# Patient Record
Sex: MALE | Race: WHITE | Employment: OTHER | URBAN - NONMETROPOLITAN AREA
[De-identification: names, ages, dates, MRNs, and addresses within clinical notes are randomized per-mention and may not be internally consistent; named-entity substitution may affect disease eponyms.]

---

## 2020-11-14 ENCOUNTER — HOSPITAL ENCOUNTER (EMERGENCY)
Facility: OTHER | Age: 55
Discharge: HOME | End: 2020-11-14
Attending: PHYSICIAN ASSISTANT | Admitting: PHYSICIAN ASSISTANT
Payer: COMMERCIAL

## 2020-11-14 ENCOUNTER — APPOINTMENT (OUTPATIENT)
Dept: GENERAL RADIOLOGY | Facility: OTHER | Age: 55
End: 2020-11-14
Attending: PHYSICIAN ASSISTANT
Payer: COMMERCIAL

## 2020-11-14 VITALS
HEART RATE: 68 BPM | OXYGEN SATURATION: 96 % | WEIGHT: 300 LBS | HEIGHT: 76 IN | BODY MASS INDEX: 36.53 KG/M2 | DIASTOLIC BLOOD PRESSURE: 110 MMHG | SYSTOLIC BLOOD PRESSURE: 186 MMHG | TEMPERATURE: 100 F | RESPIRATION RATE: 20 BRPM

## 2020-11-14 DIAGNOSIS — S51.812A FOREARM LACERATION, LEFT, INITIAL ENCOUNTER: ICD-10-CM
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
## 2020-11-14 PROCEDURE — 99283 EMERGENCY DEPT VISIT LOW MDM: CPT | Mod: 25 | Performed by: PHYSICIAN ASSISTANT

## 2020-11-14 PROCEDURE — 73090 X-RAY EXAM OF FOREARM: CPT | Mod: LT

## 2020-11-14 PROCEDURE — 12001 RPR S/N/AX/GEN/TRNK 2.5CM/<: CPT | Performed by: PHYSICIAN ASSISTANT

## 2020-11-14 PROCEDURE — 99282 EMERGENCY DEPT VISIT SF MDM: CPT | Mod: 25 | Performed by: PHYSICIAN ASSISTANT

## 2020-11-14 RX ORDER — LIDOCAINE HYDROCHLORIDE AND EPINEPHRINE 10; 10 MG/ML; UG/ML
5 INJECTION, SOLUTION INFILTRATION; PERINEURAL ONCE
Status: DISCONTINUED | OUTPATIENT
Start: 2020-11-14 | End: 2020-11-14 | Stop reason: HOSPADM

## 2020-11-14 ASSESSMENT — ENCOUNTER SYMPTOMS
ABDOMINAL PAIN: 0
FEVER: 0
CHILLS: 0
BACK PAIN: 0
CONFUSION: 0
SHORTNESS OF BREATH: 0
BRUISES/BLEEDS EASILY: 0
CHEST TIGHTNESS: 0
ADENOPATHY: 0
HEMATURIA: 0
WOUND: 1

## 2020-11-14 ASSESSMENT — MIFFLIN-ST. JEOR: SCORE: 2293.32

## 2020-11-14 NOTE — ED PROVIDER NOTES
"  History     Chief Complaint   Patient presents with     Puncture Wound     Laceration     HPI  Jerry Clement is a 55 year old male who presents to the ED for evaluation of a fall/laceration. He states he was on scaffolding that collapsed and he \"rode it down\" landing on left knee, left forearm and left shoulder.  He has good ROM in knee and shoulder, however sore.  His forearm has a laceration where he states something (he's thinking OSB board) punctured his arm.  He rates his pain 6/10.    Allergies:  No Known Allergies    Problem List:    There are no active problems to display for this patient.       Past Medical History:    History reviewed. No pertinent past medical history.    Past Surgical History:    History reviewed. No pertinent surgical history.    Family History:    History reviewed. No pertinent family history.    Social History:  Marital Status:   [2]  Social History     Tobacco Use     Smoking status: Never Smoker     Smokeless tobacco: Never Used   Substance Use Topics     Alcohol use: Yes     Alcohol/week: 4.0 standard drinks     Types: 4 Cans of beer per week     Drug use: Never        Medications:    No current outpatient medications on file.        Review of Systems   Constitutional: Negative for chills and fever.   HENT: Negative for congestion.    Eyes: Negative for visual disturbance.   Respiratory: Negative for chest tightness and shortness of breath.    Cardiovascular: Negative for chest pain.   Gastrointestinal: Negative for abdominal pain.   Genitourinary: Negative for hematuria.   Musculoskeletal: Negative for back pain.        Left forearm swelling, left shoulder pain   Skin: Positive for wound. Negative for rash.   Neurological: Negative for syncope.   Hematological: Negative for adenopathy. Does not bruise/bleed easily.   Psychiatric/Behavioral: Negative for confusion.       Physical Exam   BP: (!) 186/110  Pulse: 68  Temp: 100  F (37.8  C)  Resp: 20  Height: 192.4 cm (6' " "3.75\")  Weight: 136.1 kg (300 lb)  SpO2: 96 %      Physical Exam  Constitutional:       General: He is not in acute distress.     Appearance: He is well-developed. He is not diaphoretic.   HENT:      Head: Normocephalic and atraumatic.   Eyes:      General: No scleral icterus.     Conjunctiva/sclera: Conjunctivae normal.   Neck:      Musculoskeletal: Neck supple.   Cardiovascular:      Rate and Rhythm: Normal rate and regular rhythm.   Pulmonary:      Effort: Pulmonary effort is normal.      Breath sounds: Normal breath sounds.   Abdominal:      Palpations: Abdomen is soft.      Tenderness: There is no abdominal tenderness.   Musculoskeletal:         General: No deformity.      Comments: General TTP of left shoulder. Left forearm swelling, TTP. Approximate 2cm laceration to left anterior forearm, very minimal bleeding.   Lymphadenopathy:      Cervical: No cervical adenopathy.   Skin:     General: Skin is warm and dry.      Findings: No rash.   Neurological:      Mental Status: He is alert and oriented to person, place, and time. Mental status is at baseline.   Psychiatric:         Mood and Affect: Mood normal.         Behavior: Behavior normal.         Thought Content: Thought content normal.         Judgment: Judgment normal.         ED Course        [facility]    -Laceration Repair    Date/Time: 11/14/2020 9:29 PM  Performed by: Maurisio Giles PA  Authorized by: Maurisio Giles PA       ANESTHESIA (see MAR for exact dosages):     Anesthesia method:  Local infiltration    Local anesthetic:  Lidocaine 1% WITH epi  LACERATION DETAILS     Location:  Shoulder/arm    Shoulder/arm location:  L lower arm    Length (cm):  2    REPAIR TYPE:     Repair type:  Simple      EXPLORATION:     Hemostasis achieved with:  Epinephrine    Wound exploration: wound explored through full range of motion and entire depth of wound probed and visualized      Contaminated: no      TREATMENT:     Area cleansed " with:  Saline    Amount of cleaning:  Extensive    Irrigation solution:  Sterile saline    Irrigation method:  Pressure wash    Visualized foreign bodies/material removed: no      SKIN REPAIR     Repair method:  Sutures    Suture size:  4-0    Suture material:  Nylon    Suture technique:  Simple interrupted    Number of sutures:  4    APPROXIMATION     Approximation:  Close    POST-PROCEDURE DETAILS     Dressing:  Non-adherent dressing      PROCEDURE   Patient Tolerance:  Patient tolerated the procedure well with no immediate complications                     Critical Care time:  none               Results for orders placed or performed during the hospital encounter of 11/14/20 (from the past 24 hour(s))   XR Forearm Left 2 Views    Narrative    XR FOREARM LT 2 VW    HISTORY: 55 years Male fall off scaffolding, puncture wound and  swelling to left forearm, FX? FB?    COMPARISON: None    TECHNIQUE: 2 views left forearm    FINDINGS: There is no evidence of fracture or dislocation. Joint  spaces are congruent.    There is soft tissue edema of the forearm. No radiopaque foreign  bodies are seen.      Impression    IMPRESSION: Soft tissue edema. No evidence of fracture. No evidence of  radiopaque foreign body.    TAMARA CASTRO MD       Medications - No data to display    Assessments & Plan (with Medical Decision Making)   Pt nontoxic in NAD. Heart, lung, bowel sounds normal, abd soft, nontender to palpation, nondistended. VSS, afebrile.     He has no neck, back, head pain. He does have some general left shoulder pain but with full ROM. Good distal CMS. He has an approximate 2 cm laceration on the anterior portion of his left forearm, no bleeding.  Again, good distal CMS.    X-rays of left forearm showed no acute findings.  I do offer him imaging of the left shoulder but he declines at this time.    Wound was repaired, see procedure note above.  He denies a tetanus shot and says he does not believe in  vaccines.    Patient will continue conservative treatment at home or return ED if there are worsening or concerning symptoms.  He understands and agrees with plan the patient is discharged.    Maurisio Giles PA-C    I have reviewed the nursing notes.    I have reviewed the findings, diagnosis, plan and need for follow up with the patient.       There are no discharge medications for this patient.      Final diagnoses:   Fall   Forearm laceration, left, initial encounter       11/14/2020   [facility]     Maurisio Giles PA  11/14/20 0090

## 2020-11-14 NOTE — ED AVS SNAPSHOT
[facility]  [address]  [place] [address]  Phone: [phone]  Fax: [phone]                                    Jerry Clement   MRN: [id number]    Department: [facility]   Date of Visit: 11/14/2020           After Visit Summary Signature Page    I have received my discharge instructions, and my questions have been answered. I have discussed any challenges I see with this plan with the nurse or doctor.    ..........................................................................................................................................  Patient/Patient Representative Signature      ..........................................................................................................................................  Patient Representative Print Name and Relationship to Patient    ..................................................               ................................................  Date                                   Time    ..........................................................................................................................................  Reviewed by Signature/Title    ...................................................              ..............................................  Date                                               Time          22EPIC Rev 08/18

## 2020-11-14 NOTE — ED TRIAGE NOTES
"Patient presents to ER stating he was on scaffolding that collapsed and he \"rode it down\" landing on left knee, left forearm and left shoulder.  He has good ROM in knee and shoulder, however sore.  His forearm has a laceration where he states something (he's thinking OSB board) punctured his arm.  He rates his pain 6/10, good CWMS.   "

## 2020-11-15 NOTE — DISCHARGE INSTRUCTIONS
Get plenty of fluids and rest.  You can take Tylenol to help with discomfort.  Keep your wound covered the rest of today.  Tomorrow you can remove the bandage, clean twice per day gently with soap and water, reapply bandage as needed.  Sutures should be removed approximately 10 to 14 days.  Be aware for signs of infection such as increased redness, purulent drainage, fevers of these occur seek medical attention.  Follow-up with PCP as needed.

## 2025-01-25 ENCOUNTER — OFFICE VISIT (OUTPATIENT)
Dept: FAMILY MEDICINE | Facility: OTHER | Age: 60
End: 2025-01-25
Attending: NURSE PRACTITIONER

## 2025-01-25 ENCOUNTER — HOSPITAL ENCOUNTER (OUTPATIENT)
Dept: GENERAL RADIOLOGY | Facility: OTHER | Age: 60
Discharge: HOME | End: 2025-01-25
Attending: NURSE PRACTITIONER

## 2025-01-25 VITALS
HEIGHT: 75 IN | SYSTOLIC BLOOD PRESSURE: 130 MMHG | TEMPERATURE: 98.4 F | WEIGHT: 270 LBS | DIASTOLIC BLOOD PRESSURE: 90 MMHG | OXYGEN SATURATION: 94 % | RESPIRATION RATE: 20 BRPM | BODY MASS INDEX: 33.57 KG/M2 | HEART RATE: 68 BPM

## 2025-01-25 DIAGNOSIS — R05.8 PRODUCTIVE COUGH: ICD-10-CM

## 2025-01-25 DIAGNOSIS — J20.9 ACUTE BRONCHITIS, UNSPECIFIED ORGANISM: Primary | ICD-10-CM

## 2025-01-25 DIAGNOSIS — R06.02 SHORTNESS OF BREATH: ICD-10-CM

## 2025-01-25 DIAGNOSIS — R50.9 FEVER AND CHILLS: ICD-10-CM

## 2025-01-25 LAB
ANION GAP SERPL CALCULATED.3IONS-SCNC: 12 MMOL/L (ref 7–15)
BASOPHILS # BLD AUTO: 0 10E3/UL (ref 0–0.2)
BASOPHILS NFR BLD AUTO: 0 %
BUN SERPL-MCNC: 12.5 MG/DL (ref 8–23)
CALCIUM SERPL-MCNC: 9.5 MG/DL (ref 8.8–10.4)
CHLORIDE SERPL-SCNC: 99 MMOL/L (ref 98–107)
CREAT SERPL-MCNC: 0.84 MG/DL (ref 0.67–1.17)
EGFRCR SERPLBLD CKD-EPI 2021: >90 ML/MIN/1.73M2
EOSINOPHIL # BLD AUTO: 0.1 10E3/UL (ref 0–0.7)
EOSINOPHIL NFR BLD AUTO: 1 %
ERYTHROCYTE [DISTWIDTH] IN BLOOD BY AUTOMATED COUNT: 12.8 % (ref 10–15)
GLUCOSE SERPL-MCNC: 113 MG/DL (ref 70–99)
HCO3 SERPL-SCNC: 26 MMOL/L (ref 22–29)
HCT VFR BLD AUTO: 41.6 % (ref 40–53)
HGB BLD-MCNC: 14.4 G/DL (ref 13.3–17.7)
IMM GRANULOCYTES # BLD: 0.1 10E3/UL
IMM GRANULOCYTES NFR BLD: 1 %
LYMPHOCYTES # BLD AUTO: 2.1 10E3/UL (ref 0.8–5.3)
LYMPHOCYTES NFR BLD AUTO: 26 %
MCH RBC QN AUTO: 31 PG (ref 26.5–33)
MCHC RBC AUTO-ENTMCNC: 34.6 G/DL (ref 31.5–36.5)
MCV RBC AUTO: 90 FL (ref 78–100)
MONOCYTES # BLD AUTO: 0.9 10E3/UL (ref 0–1.3)
MONOCYTES NFR BLD AUTO: 11 %
NEUTROPHILS # BLD AUTO: 4.8 10E3/UL (ref 1.6–8.3)
NEUTROPHILS NFR BLD AUTO: 61 %
NRBC # BLD AUTO: 0 10E3/UL
NRBC BLD AUTO-RTO: 0 /100
PLATELET # BLD AUTO: 318 10E3/UL (ref 150–450)
POTASSIUM SERPL-SCNC: 4.1 MMOL/L (ref 3.4–5.3)
RBC # BLD AUTO: 4.65 10E6/UL (ref 4.4–5.9)
SODIUM SERPL-SCNC: 137 MMOL/L (ref 135–145)
WBC # BLD AUTO: 7.9 10E3/UL (ref 4–11)

## 2025-01-25 PROCEDURE — 71046 X-RAY EXAM CHEST 2 VIEWS: CPT

## 2025-01-25 PROCEDURE — 36415 COLL VENOUS BLD VENIPUNCTURE: CPT | Mod: ZL | Performed by: NURSE PRACTITIONER

## 2025-01-25 PROCEDURE — 99203 OFFICE O/P NEW LOW 30 MIN: CPT | Performed by: NURSE PRACTITIONER

## 2025-01-25 PROCEDURE — 85025 COMPLETE CBC W/AUTO DIFF WBC: CPT | Mod: ZL | Performed by: NURSE PRACTITIONER

## 2025-01-25 PROCEDURE — 80048 BASIC METABOLIC PNL TOTAL CA: CPT | Mod: ZL | Performed by: NURSE PRACTITIONER

## 2025-01-25 RX ORDER — CODEINE PHOSPHATE AND GUAIFENESIN 10; 100 MG/5ML; MG/5ML
1-2 SOLUTION ORAL
Qty: 237 ML | Refills: 0 | Status: SHIPPED | OUTPATIENT
Start: 2025-01-25

## 2025-01-25 RX ORDER — BENZONATATE 200 MG/1
200 CAPSULE ORAL 3 TIMES DAILY PRN
Qty: 30 CAPSULE | Refills: 0 | Status: SHIPPED | OUTPATIENT
Start: 2025-01-25

## 2025-01-25 RX ORDER — ALBUTEROL SULFATE 90 UG/1
2 INHALANT RESPIRATORY (INHALATION) EVERY 4 HOURS PRN
Qty: 8.5 G | Refills: 0 | Status: SHIPPED | OUTPATIENT
Start: 2025-01-25

## 2025-01-25 RX ORDER — FLURBIPROFEN SODIUM 0.3 MG/ML
SOLUTION/ DROPS OPHTHALMIC
COMMUNITY
Start: 2024-03-28

## 2025-01-25 RX ORDER — INSULIN GLARGINE 100 [IU]/ML
INJECTION, SOLUTION SUBCUTANEOUS
COMMUNITY
Start: 2024-12-17

## 2025-01-25 ASSESSMENT — PAIN SCALES - GENERAL: PAINLEVEL_OUTOF10: NO PAIN (0)

## 2025-01-25 NOTE — PROGRESS NOTES
ASSESSMENT/PLAN:     I have reviewed the nursing notes.  I have reviewed the findings, diagnosis, plan and need for follow up with the patient.      1. Fever and chills  - XR Chest 2 Views  - CBC and Differential  - Basic Metabolic Panel    2. Productive cough  - XR Chest 2 Views  - CBC and Differential  - Basic Metabolic Panel    3. Shortness of breath  - XR Chest 2 Views  - CBC and Differential  - Basic Metabolic Panel  - albuterol (PROAIR HFA/PROVENTIL HFA/VENTOLIN HFA) 108 (90 Base) MCG/ACT inhaler; Inhale 2 puffs into the lungs every 4 hours as needed for shortness of breath, wheezing or cough.  Dispense: 8.5 g; Refill: 0    4. Acute bronchitis, unspecified organism (Primary)  - guaiFENesin-codeine (ROBITUSSIN AC) 100-10 MG/5ML solution; Take 5-10 mLs by mouth nightly as needed for cough.  Dispense: 237 mL; Refill: 0  - benzonatate (TESSALON) 200 MG capsule; Take 1 capsule (200 mg) by mouth 3 times daily as needed for cough.  Dispense: 30 capsule; Refill: 0    CXR completed and personally reviewed, radiologist over read:  No focal consolidation or effusion. Mild bronchial wall thickening. Heart size is normal.   CBC with normal WBC of 7.9 and normal differential.  BMP with normal results  Discussed xray and lab results.  Low suspicion for bacterial respiratory illness with xray and labs; suspect viral illness with inflammation.  No clinical indications for antibiotic treatment at this time.    Opted to avoid oral steroids due to diabetes.  Opted to try Albuterol inhaler and cough suppressant.    Symptomatic treatment - Encouraged fluids, honey, elevation, humidifier, lozenges, tea, soup, topical vapor rub, rest, etc   May use over-the-counter Tylenol or ibuprofen PRN    Discussed warning signs/symptoms indicative of need to f/u  Follow up if symptoms persist or worsen or concerns      I explained my diagnostic considerations and recommendations to the patient, who voiced understanding and agreement with the  "treatment plan. All questions were answered. We discussed potential side effects of any prescribed or recommended therapies, as well as expectations for response to treatments.    Opal Leary NP  [facility]      SUBJECTIVE:   Jerry Clement is a 59 year old male who presents to clinic today for the following health issues:  Cough and fatigue    HPI  Cough, fevers, and fatigue for the past 8 days (started on 1/17/25).  States fevers have been high and persistent initially and then low grade the past 4 days, afebrile today.  Cough is deep in chest with some heaviness and mild shortness of breath.  No sore throat, nasal drainage/congestion, or headaches.  No chest pain, palpitations, or lower extremity edema.   Hx of recurrent cough/respiratory illnesses over the past few months but patient states they do clear between illnesses.  Taking OTC cough medication.            No past medical history on file.  No past surgical history on file.  Social History     Tobacco Use    Smoking status: Never    Smokeless tobacco: Never   Substance Use Topics    Alcohol use: Yes     Alcohol/week: 4.0 standard drinks of alcohol     Types: 4 Cans of beer per week     Current Outpatient Medications   Medication Sig Dispense Refill    B-D U/F insulin pen needle USE TO INJECT INSULIN EVERY EVENING      blood glucose (NO BRAND SPECIFIED) test strip USE TO CHECK BLOOD SUGAR THREE TIMES DAILY      INSULIN GLARGINE 100 UNIT/ML pen ADMINISTER 18 UNITS UNDER THE SKIN EVERY NIGHT AT BEDTIME       No Known Allergies      Past medical history, past surgical history, current medications and allergies reviewed and accurate to the best of my knowledge.        OBJECTIVE:     /90 (BP Location: Right arm, Patient Position: Sitting, Cuff Size: Adult Large)   Pulse 68   Temp 98.4  F (36.9  C) (Tympanic)   Resp 20   Ht 1.911 m (6' 3.25\")   Wt 122.5 kg (270 lb)   SpO2 94%   BMI 33.52 kg/m    Body mass index is 33.52 " kg/m .        Physical Exam  General Appearance: Well appearing adult male, appropriate appearance for age. No acute distress  Orophayrnx: voice clear, mask on.    Nose:  No noted drainage or congestion   Neck: supple without adenopathy  Respiratory: normal chest wall and respirations.  Normal effort.  Clear to auscultation bilaterally, no wheezing, crackles or rhonchi.  No increased work of breathing.  No cough appreciated.  Cardiac: RRR with no murmurs  Musculoskeletal:  Equal movement of bilateral upper extremities.  Equal movement of bilateral lower extremities.  Normal gait.    Psychological: normal affect, alert, oriented, and pleasant.       Imaging and Labs:  Results for orders placed or performed in visit on 01/25/25   XR Chest 2 Views     Status: None    Narrative    EXAM: XR CHEST 2 VIEWS  LOCATION: [facility]  DATE: 1/25/2025    INDICATION:  Productive cough, Fever and chills, Shortness of breath  COMPARISON: None.      Impression    IMPRESSION: No focal consolidation or effusion. Mild bronchial wall thickening. Heart size is normal. No acute osseous findings.   Basic Metabolic Panel     Status: Abnormal   Result Value Ref Range    Sodium 137 135 - 145 mmol/L    Potassium 4.1 3.4 - 5.3 mmol/L    Chloride 99 98 - 107 mmol/L    Carbon Dioxide (CO2) 26 22 - 29 mmol/L    Anion Gap 12 7 - 15 mmol/L    Urea Nitrogen 12.5 8.0 - 23.0 mg/dL    Creatinine 0.84 0.67 - 1.17 mg/dL    GFR Estimate >90 >60 mL/min/1.73m2    Calcium 9.5 8.8 - 10.4 mg/dL    Glucose 113 (H) 70 - 99 mg/dL   CBC with platelets and differential     Status: None   Result Value Ref Range    WBC Count 7.9 4.0 - 11.0 10e3/uL    RBC Count 4.65 4.40 - 5.90 10e6/uL    Hemoglobin 14.4 13.3 - 17.7 g/dL    Hematocrit 41.6 40.0 - 53.0 %    MCV 90 78 - 100 fL    MCH 31.0 26.5 - 33.0 pg    MCHC 34.6 31.5 - 36.5 g/dL    RDW 12.8 10.0 - 15.0 %    Platelet Count 318 150 - 450 10e3/uL    % Neutrophils 61 %    % Lymphocytes 26 %    %  Monocytes 11 %    % Eosinophils 1 %    % Basophils 0 %    % Immature Granulocytes 1 %    NRBCs per 100 WBC 0 <1 /100    Absolute Neutrophils 4.8 1.6 - 8.3 10e3/uL    Absolute Lymphocytes 2.1 0.8 - 5.3 10e3/uL    Absolute Monocytes 0.9 0.0 - 1.3 10e3/uL    Absolute Eosinophils 0.1 0.0 - 0.7 10e3/uL    Absolute Basophils 0.0 0.0 - 0.2 10e3/uL    Absolute Immature Granulocytes 0.1 <=0.4 10e3/uL    Absolute NRBCs 0.0 10e3/uL   CBC and Differential     Status: None (In process)    Narrative    The following orders were created for panel order CBC and Differential.  Procedure                               Abnormality         Status                     ---------                               -----------         ------                     CBC with platelets and d...[[id number]]                      Final result               RBC and Platelet Morphology[[id number]]                      In process                   Please view results for these tests on the individual orders.

## 2025-01-25 NOTE — NURSING NOTE
Pt here for a cough since January 17th.  High fevers until Tuesday night and then low grade.  Extreme fatigue. Has had some sort of respiratory illness monthly for the past 6 months.    Dahiana Camp CMA ([facility])......................1/25/2025  1:21 PM     Medication Reconciliation: complete    Dahiana Camp CMA  1/25/2025 1:21 PM      FOOD SECURITY SCREENING QUESTIONS:    The next two questions are to help us understand your food security.  If you are feeling you need any assistance in this area, we have resources available to support you today.    Hunger Vital Signs:  Within the past 12 months we worried whether our food would run out before we got money to buy more. Never  Within the past 12 months the food we bought just didn't last and we didn't have money to get more. Never  Dahiana Camp CMA,LPN on 1/25/2025 at 1:21 PM

## 2025-02-09 ENCOUNTER — HEALTH MAINTENANCE LETTER (OUTPATIENT)
Age: 60
End: 2025-02-09

## (undated) RX ORDER — LIDOCAINE HYDROCHLORIDE AND EPINEPHRINE 10; 10 MG/ML; UG/ML
INJECTION, SOLUTION INFILTRATION; PERINEURAL
Status: DISPENSED
Start: 2020-11-14